# Patient Record
Sex: MALE | Race: WHITE
[De-identification: names, ages, dates, MRNs, and addresses within clinical notes are randomized per-mention and may not be internally consistent; named-entity substitution may affect disease eponyms.]

---

## 2012-09-13 VITALS — DIASTOLIC BLOOD PRESSURE: 99 MMHG | SYSTOLIC BLOOD PRESSURE: 137 MMHG

## 2019-12-29 ENCOUNTER — HOSPITAL ENCOUNTER (EMERGENCY)
Dept: HOSPITAL 97 - ER | Age: 74
Discharge: HOME | End: 2019-12-29
Payer: COMMERCIAL

## 2019-12-29 VITALS — TEMPERATURE: 99.3 F

## 2019-12-29 VITALS — OXYGEN SATURATION: 97 %

## 2019-12-29 VITALS — SYSTOLIC BLOOD PRESSURE: 138 MMHG | DIASTOLIC BLOOD PRESSURE: 86 MMHG

## 2019-12-29 DIAGNOSIS — J01.90: Primary | ICD-10-CM

## 2019-12-29 DIAGNOSIS — Z79.01: ICD-10-CM

## 2019-12-29 DIAGNOSIS — Z86.718: ICD-10-CM

## 2019-12-29 DIAGNOSIS — F17.210: ICD-10-CM

## 2019-12-29 LAB
ALBUMIN SERPL BCP-MCNC: 3.2 G/DL (ref 3.4–5)
ALP SERPL-CCNC: 73 U/L (ref 45–117)
ALT SERPL W P-5'-P-CCNC: 31 U/L (ref 12–78)
AST SERPL W P-5'-P-CCNC: 25 U/L (ref 15–37)
BUN BLD-MCNC: 20 MG/DL (ref 7–18)
GLUCOSE SERPLBLD-MCNC: 122 MG/DL (ref 74–106)
HCT VFR BLD CALC: 42 % (ref 39.6–49)
LYMPHOCYTES # SPEC AUTO: 1.5 K/UL (ref 0.7–4.9)
PMV BLD: 8.3 FL (ref 7.6–11.3)
POTASSIUM SERPL-SCNC: 3.9 MMOL/L (ref 3.5–5.1)
RBC # BLD: 4.52 M/UL (ref 4.33–5.43)
TSH SERPL DL<=0.05 MIU/L-ACNC: 1.85 UIU/ML (ref 0.36–3.74)

## 2019-12-29 PROCEDURE — 87081 CULTURE SCREEN ONLY: CPT

## 2019-12-29 PROCEDURE — 36415 COLL VENOUS BLD VENIPUNCTURE: CPT

## 2019-12-29 PROCEDURE — 71046 X-RAY EXAM CHEST 2 VIEWS: CPT

## 2019-12-29 PROCEDURE — 87804 INFLUENZA ASSAY W/OPTIC: CPT

## 2019-12-29 PROCEDURE — 80053 COMPREHEN METABOLIC PANEL: CPT

## 2019-12-29 PROCEDURE — 99284 EMERGENCY DEPT VISIT MOD MDM: CPT

## 2019-12-29 PROCEDURE — 84443 ASSAY THYROID STIM HORMONE: CPT

## 2019-12-29 PROCEDURE — 87070 CULTURE OTHR SPECIMN AEROBIC: CPT

## 2019-12-29 PROCEDURE — 85025 COMPLETE CBC W/AUTO DIFF WBC: CPT

## 2019-12-29 NOTE — EDPHYS
Physician Documentation                                                                           

 [facility]                                                                 

Name: Ilia Timmons                                                                              

Age: 74 yrs                                                                                       

Sex: Male                                                                                         

: 1945                                                                                   

MRN: [id number]                                                                                   

Arrival Date: 2019                                                                          

Time: 13:11                                                                                       

Account#: [id number]                                                                            

Bed 4                                                                                             

Private MD: Carmenza Thomas H                                                                    

ED Physician Keanu Mcallister                                                                      

HPI:                                                                                              

                                                                                             

15:06 This 74 yrs old  Male presents to ER via Ambulatory with complaints of Chest   pm1 

      Congestion.                                                                                 

15:06 The patient or guardian reports cough, sinus congestion and pain. Onset: The            pm1 

      symptoms/episode began/occurred 3 week(s) ago. Severity of symptoms: in the emergency       

      department the symptoms cough and improved but sinus pain and congestion worse.             

      Modifying factors: The symptoms are alleviated by nothing, the symptoms are aggravated      

      by nothing. Associated signs and symptoms: Pertinent positives: earache, decreased          

      energy. On and off fever and chills, Pertinent negatives: chest pain, diarrhea, sore        

      throat, vomiting. The patient has not recently seen a physician.                            

                                                                                                  

Historical:                                                                                       

- Allergies:                                                                                      

13:33 No Known Allergies;                                                                     ss  

- Home Meds:                                                                                      

13:33 Warfarin Oral [Active];                                                                 ss  

- PMHx:                                                                                           

13:33 DVT; PE;                                                                                ss  

- PSHx:                                                                                           

13:33 back sx; right shoulder; Knee surgery;                                                  ss  

                                                                                                  

- Immunization history:: Adult Immunizations up to date.                                          

- Social history:: Smoking status: Patient uses tobacco products, smokes two packs                

  cigarettes per day.                                                                             

- Ebola Screening: : Patient negative for fever greater than or equal to 101.5 degrees            

  Fahrenheit, and additional compatible Ebola Virus Disease symptoms.                             

                                                                                                  

                                                                                                  

ROS:                                                                                              

15:06 Constitutional: Negative for fever, chills, and weight loss, Eyes: Negative for injury, pm1 

      pain, redness, and discharge.                                                               

15:06 Neck: Negative for injury, pain, and swelling, Cardiovascular: Negative for chest pain,     

      palpitations, and edema.                                                                    

15:06 Abdomen/GI: Negative for abdominal pain, nausea, vomiting, diarrhea, and constipation,      

      Back: Negative for injury and pain, : Negative for injury, bleeding, discharge, and       

      swelling, MS/Extremity: Negative for injury and deformity, Skin: Negative for injury,       

      rash, and discoloration, Neuro: Negative for headache, weakness, numbness, tingling,        

      and seizure.                                                                                

15:06 ENT: Positive for ear pain, sinus congestion, sinus pain, Negative for difficulty           

      swallowing, difficulty handling secretions, hoarseness.                                     

15:06 Respiratory: Positive for cough, Negative for shortness of breath, sputum production,       

      wheezing.                                                                                   

                                                                                                  

Exam:                                                                                             

15:06 Constitutional:  This is a well developed, well nourished patient who is awake, alert,  pm1 

      and in no acute distress.                                                                   

15:06 Eyes:  Pupils equal round and reactive to light, extra-ocular motions intact.  Lids and     

      lashes normal.  Conjunctiva and sclera are non-icteric and not injected.  Cornea within     

      normal limits.  Periorbital areas with no swelling, redness, or edema. ENT:  Nares          

      patent. No nasal discharge, no septal abnormalities noted.  Tympanic membranes are          

      normal and external auditory canals are clear.  Oropharynx with no redness, swelling,       

      or masses, exudates, or evidence of obstruction, uvula midline.  Mucous membranes           

      moist. Neck:  Trachea midline, no thyromegaly or masses palpated, and no cervical           

      lymphadenopathy.  Supple, full range of motion without nuchal rigidity, or vertebral        

      point tenderness.  No Meningismus. Chest/axilla:  Normal chest wall appearance and          

      motion.  Nontender with no deformity.  No lesions are appreciated. Cardiovascular:          

      Regular rate and rhythm with a normal S1 and S2.  No gallops, murmurs, or rubs.  Normal     

      PMI, no JVD.  No pulse deficits. Respiratory:  Lungs have equal breath sounds               

      bilaterally, clear to auscultation and percussion.  No rales, rhonchi or wheezes noted.     

       No increased work of breathing, no retractions or nasal flaring. Abdomen/GI:  Soft,        

      non-tender, with normal bowel sounds.  No distension or tympany.  No guarding or            

      rebound.  No evidence of tenderness throughout. Back:  No spinal tenderness.  No            

      costovertebral tenderness.  Full range of motion. Skin:  Warm, dry with normal turgor.      

      Normal color with no rashes, no lesions, and no evidence of cellulitis. MS/ Extremity:      

      Pulses equal, no cyanosis.  Neurovascular intact.  Full, normal range of motion.            

15:06 Head/face: Sinus tenderness, is located over the  right frontal sinus, left frontal         

      sinus, right maxillary sinus and left maxillary sinus.                                      

15:06 Neuro: Orientation: is normal, Motor: is normal, moves all fours.                           

                                                                                                  

Vital Signs:                                                                                      

13:33  / 91; Pulse 84; Resp 19; Temp 99.3(TE); Pulse Ox 94% on R/A; Weight 90.72 kg     ss  

      (R); Height 6 ft. 0 in. (182.88 cm) (R); Pain 2/10;                                         

15:27 BP 91 / 54; Pulse 64; Resp 17; Pulse Ox 95% on R/A;                                     tw2 

15:53  / 83; Pulse 65; Resp 17; Pulse Ox 97% on R/A;                                    tw2 

16:32  / 86; Pulse 66; Resp 17; Pulse Ox 97% on R/A;                                    tw2 

13:33 Body Mass Index 27.12 (90.72 kg, 182.88 cm)                                             ss  

                                                                                                  

MDM:                                                                                              

14:12 Patient medically screened.                                                             pm1 

16:10 Data reviewed: vital signs. Data interpreted: Pulse oximetry: on room air is 97 %.      pm1 

      Interpretation: normal. Counseling: I had a detailed discussion with the patient and/or     

      guardian regarding: the historical points, exam findings, and any diagnostic results        

      supporting the discharge/admit diagnosis, lab results, radiology results, the need for      

      outpatient follow up, to return to the emergency department if symptoms worsen or           

      persist or if there are any questions or concerns that arise at home.                       

                                                                                                  

                                                                                             

14:20 Order name: CBC with Diff; Complete Time: 15:01                                         pm1 

                                                                                             

14:20 Order name: CMP; Complete Time: 15:26                                                   pm1 

                                                                                             

14:20 Order name: TSH; Complete Time: 15:26                                                   pm1 

                                                                                             

14:20 Order name: Flu; Complete Time: 15:26                                                   pm1 

                                                                                             

14:20 Order name: Strep; Complete Time: 15:26                                                 pm1 

                                                                                             

15:06 Order name: Throat Culture                                                              EDMS

                                                                                             

14:20 Order name: Chest Pa And Lat (2 Views) XRAY; Complete Time: 16:09                       pm1 

                                                                                             

14:21 Order name: IV Start; Complete Time: 14:56                                              tw2 

                                                                                                  

Administered Medications:                                                                         

No medications were administered                                                                  

                                                                                                  

                                                                                                  

Disposition:                                                                                      

19 16:10 Discharged to Home. Impression: Acute sinusitis.                                   

- Condition is Stable.                                                                            

- Discharge Instructions: Sinusitis, Adult.                                                       

- Prescriptions for Amoxicillin 500 mg Oral Capsule - take 1 capsule by ORAL route                

  every 8 hours for 10 days; 30 tablet.                                                           

- Medication Reconciliation Form, Thank You Letter, Antibiotic Education, Prescription            

  Opioid Use form.                                                                                

- Follow up: Emergency Department; When: As needed; Reason: Worsening of condition.               

  Follow up: Private Physician; When: 2 - 3 days; Reason: Recheck today's complaints,             

  Continuance of care, Re-evaluation by your physician.                                           

- Problem is new.                                                                                 

- Symptoms have improved.                                                                         

                                                                                                  

                                                                                                  

                                                                                                  

Addendum:                                                                                         

2020                                                                                        

     11:06 Co-signature as Attending Physician, Keanu Mcallister MD I agree with the assessment and  c
ha

           plan of care.                                                                          

                                                                                                  

Signatures:                                                                                       

Dispatcher [employer]                           EDKeanu Hudson MD MD cha Smirch, Shelby, RN                      RN   ss                                                   

Lalo Solorio NP                    NP   pm1                                                  

Grecia Henning RN                          RN   tw2                                                  

                                                                                                  

Corrections: (The following items were deleted from the chart)                                    

                                                                                             

16:33 16:10 2019 16:10 Discharged to Home. Impression: Acute sinusitis. Condition is    tw2 

      Stable. Forms are Medication Reconciliation Form, Thank You Letter, Antibiotic              

      Education, Prescription Opioid Use. Follow up: Emergency Department; When: As needed;       

      Reason: Worsening of condition. Follow up: Private Physician; When: 2 - 3 days; Reason:     

      Recheck today's complaints, Continuance of care, Re-evaluation by your physician.           

      Problem is new. Symptoms have improved. pm1                                                 

                                                                                                  

**************************************************************************************************

## 2019-12-29 NOTE — RAD REPORT
EXAM DESCRIPTION:  Avni Johnson (2 Views)12/29/2019 3:23 pm

 

CLINICAL HISTORY:  Cough

 

COMPARISON:  2012

 

FINDINGS:   The lungs appear clear of acute infiltrate. The heart is normal size

 

Old rib fractures are present

 

IMPRESSION:   No acute abnormalities displayed

## 2019-12-29 NOTE — ER
Nurse's Notes                                                                                     

 [facility] [place]                                                                 

Name: Ilia Timmons                                                                              

Age: 74 yrs                                                                                       

Sex: Male                                                                                         

: 1945                                                                                   

MRN: [id number]                                                                                   

Arrival Date: 2019                                                                          

Time: 13:11                                                                                       

Account#: [id number]                                                                            

Bed 4                                                                                             

Private MD: Carmenza Thomas H                                                                    

Diagnosis: Acute sinusitis                                                                        

                                                                                                  

Presentation:                                                                                     

                                                                                             

13:30 Presenting complaint: Patient states: chest congestion x several weeks, chills, fever   ss  

      intermittent. No appetite and energy. Transition of care: patient was not received from     

      another setting of care. Onset of symptoms is unknown.                                      

13:30 Method Of Arrival: Ambulatory                                                           ss  

13:30 Acuity: TAMMIE 4                                                                           ss  

15:55 Risk Assessment: Do you want to hurt yourself or someone else? Patient reports no       tw2 

      desire to harm self or others. Initial Sepsis Screen: Does the patient meet any 2           

      criteria? No. Patient's initial sepsis screen is negative. Does the patient have a          

      suspected source of infection? No. Patient's initial sepsis screen is negative. Care        

      prior to arrival: None.                                                                     

                                                                                                  

Triage Assessment:                                                                                

13:35 Neuro: Level of Consciousness is awake, alert, obeys commands, Oriented to person,      ss  

      place, time, situation. Cardiovascular: Capillary refill < 3 seconds is brisk in            

      bilateral fingers. Respiratory: Airway is patent Respiratory effort is even, unlabored,     

      Respiratory pattern is regular, symmetrical. Respiratory: Reports cough that is. Derm:      

      Skin is pink, warm \T\ dry.                                                                 

                                                                                                  

Historical:                                                                                       

- Allergies:                                                                                      

13:33 No Known Allergies;                                                                     ss  

- Home Meds:                                                                                      

13:33 Warfarin Oral [Active];                                                                 ss  

- PMHx:                                                                                           

13:33 DVT; PE;                                                                                ss  

- PSHx:                                                                                           

13:33 back sx; right shoulder; Knee surgery;                                                  ss  

                                                                                                  

- Immunization history:: Adult Immunizations up to date.                                          

- Social history:: Smoking status: Patient uses tobacco products, smokes two packs                

  cigarettes per day.                                                                             

- Ebola Screening: : Patient negative for fever greater than or equal to 101.5 degrees            

  Fahrenheit, and additional compatible Ebola Virus Disease symptoms.                             

                                                                                                  

                                                                                                  

Screening:                                                                                        

15:55 Abuse screen: Denies threats or abuse. Nutritional screening: No deficits noted.        tw2 

      Tuberculosis screening: No symptoms or risk factors identified. Fall Risk None              

      identified.                                                                                 

                                                                                                  

Assessment:                                                                                       

14:03 General: Appears in no apparent distress. Behavior is calm, cooperative, appropriate    tw2 

      for age. Pain: Denies pain. Neuro: Level of Consciousness is awake, alert, obeys            

      commands, Oriented to person, place, time, situation. Cardiovascular: Heart tones S1 S2     

      Patient's skin is warm and dry. Respiratory: Reports cough that is non-productive,          

      persistent Airway is patent Respiratory effort is even, unlabored, Respiratory pattern      

      is regular, symmetrical, Breath sounds are clear bilaterally. GI: No signs and/or           

      symptoms were reported involving the gastrointestinal system. Abdomen is flat, Bowel        

      sounds present X 4 quads. : No signs and/or symptoms were reported regarding the          

      genitourinary system. EENT: Reports nasal congestion nasal discharge. Derm: No signs        

      and/or symptoms reported regarding the dermatologic system. Musculoskeletal: Range of       

      motion: intact in all extremities.                                                          

15:27 Reassessment: Patient appears in no apparent distress at this time. No changes from     tw2 

      previously documented assessment. Patient and/or family updated on plan of care and         

      expected duration. Pain level reassessed. Patient is alert, oriented x 3, equal             

      unlabored respirations, skin warm/dry/pink.                                                 

15:54 Reassessment: Patient appears in no apparent distress at this time. No changes from     tw2 

      previously documented assessment. Patient and/or family updated on plan of care and         

      expected duration. Pain level reassessed. Patient is alert, oriented x 3, equal             

      unlabored respirations, skin warm/dry/pink.                                                 

16:32 Reassessment: Patient appears in no apparent distress at this time. No changes from     tw2 

      previously documented assessment. Patient and/or family updated on plan of care and         

      expected duration. Pain level reassessed. Patient is alert, oriented x 3, equal             

      unlabored respirations, skin warm/dry/pink.                                                 

                                                                                                  

Vital Signs:                                                                                      

13:33  / 91; Pulse 84; Resp 19; Temp 99.3(TE); Pulse Ox 94% on R/A; Weight 90.72 kg     ss  

      (R); Height 6 ft. 0 in. (182.88 cm) (R); Pain 2/10;                                         

15:27 BP 91 / 54; Pulse 64; Resp 17; Pulse Ox 95% on R/A;                                     tw2 

15:53  / 83; Pulse 65; Resp 17; Pulse Ox 97% on R/A;                                    tw2 

16:32  / 86; Pulse 66; Resp 17; Pulse Ox 97% on R/A;                                    tw2 

13:33 Body Mass Index 27.12 (90.72 kg, 182.88 cm)                                               

                                                                                                  

ED Course:                                                                                        

13:11 Patient arrived in ED.                                                                  as  

13:12 Carmenza Thomas DO is Private Physician.                                               as  

13:32 Triage completed.                                                                       ss  

13:33 Arm band placed on right wrist.                                                           

14:03 Bed in low position. Side rails up X2. Cardiac monitor on. Pulse ox on. NIBP on. Warm   tw2 

      blanket given.                                                                              

14:11 Grecia Henning, RN is Primary Nurse.                                                        tw2 

14:12 Lalo Solorio NP is PHCP.                                                           pm1 

14:12 Keanu Mcallister MD is Attending Physician.                                             pm1 

15:22 Chest Pa And Lat (2 Views) XRAY In Process Unspecified.                                 EDMS

16:32 No provider procedures requiring assistance completed. IV discontinued, intact,         tw2 

      bleeding controlled, No redness/swelling at site. Pressure dressing applied.                

                                                                                                  

Administered Medications:                                                                         

No medications were administered                                                                  

                                                                                                  

                                                                                                  

Outcome:                                                                                          

16:10 Discharge ordered by MD.                                                                pm1 

16:32 Discharged to home ambulatory, with family.                                             tw2 

16:32 Condition: stable                                                                           

16:32 Discharge instructions given to patient, family, Instructed on discharge instructions,      

      follow up and referral plans. medication usage, Demonstrated understanding of               

      instructions, follow-up care, medications, Prescriptions given X 2.                         

16:33 Patient left the ED.                                                                    tw2 

                                                                                                  

Signatures:                                                                                       

Dispatcher [employer]                           Bethany Romo Shelby, RN RN                                                      

Lalo Solorio NP                    NP   pm1                                                  

Grecia Henning RN                          RN   tw2                                                  

                                                                                                  

**************************************************************************************************

## 2021-07-27 ENCOUNTER — HOSPITAL ENCOUNTER (EMERGENCY)
Dept: HOSPITAL 97 - ER | Age: 76
Discharge: HOME | End: 2021-07-27
Payer: COMMERCIAL

## 2021-07-27 DIAGNOSIS — F17.210: ICD-10-CM

## 2021-07-27 DIAGNOSIS — S22.41XA: Primary | ICD-10-CM
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
## 2021-07-27 DIAGNOSIS — J44.1: ICD-10-CM

## 2021-07-27 DIAGNOSIS — Z86.718: ICD-10-CM

## 2021-07-27 DIAGNOSIS — Z86.711: ICD-10-CM

## 2021-07-27 NOTE — RAD REPORT
EXAM DESCRIPTION:  RAD - Ribs  Right - 7/27/2021 12:42 pm

 

CLINICAL HISTORY:  fall, SOB

 

COMPARISON:  Chest Pa And Lat (2 Views) dated 12/29/2019

 

FINDINGS:  There is mild cortical offset is seen involving the right lateral fourth and fifth ribs pantoja
spicious for rib fractures. No underlying pneumothorax is seen.

## 2021-07-27 NOTE — ER
Nurse's Notes                                                                                     

 [facility]                                                                 

Name: Ilia Timmons                                                                              

Age: 76 yrs                                                                                       

Sex: Male                                                                                         

: 1945                                                                                   

MRN: [id number]                                                                                   

Arrival Date: 2021                                                                          

Time: 11:08                                                                                       

Account#: [id number]                                                                            

Bed 2                                                                                             

Private MD: Carmenza Thomas H                                                                    

Diagnosis: COPD/ Chronic obstructive pulmonary disease with (acute) exacerbation;Multiple         

  fractures of ribs, right side                                                                   

                                                                                                  

Presentation:                                                                                     

                                                                                             

11:22 Chief complaint: Patient states: fell on  , was sitting kermit stool and his feet    iw  

      got tangled up and he fell and hit the brick porch with left knee and left elbow and        

      right side of ribs, now feels like he has some chest congestion, seems to have SOB on       

      exertion. Care prior to arrival: None. Mechanism of Injury: Fall out of chair. Trauma       

      event details: Injury occurred in the [facility].                                   

11:22 Acuity: TAMMIE 3                                                                           iw  

11:22 Method Of Arrival: Wheelchair                                                           iw  

11:24 Coronavirus screen: At this time, the client does not indicate any symptoms associated  iw  

      with coronavirus-19. Ebola Screen: Patient negative for fever greater than or equal to      

      101.5 degrees Fahrenheit, and additional compatible Ebola Virus Disease symptoms            

      Patient denies exposure to infectious person. Patient denies travel to an                   

      Ebola-affected area in the 21 days before illness onset. No symptoms or risks               

      identified at this time. Initial Sepsis Screen: Does the patient meet any 2 criteria?       

      No. Patient's initial sepsis screen is negative. Does the patient have a suspected          

      source of infection? No. Patient's initial sepsis screen is negative. Risk Assessment:      

      Do you want to hurt yourself or someone else? Patient reports no desire to harm self or     

      others. Onset of symptoms was 2021.                                                

                                                                                                  

Trauma Activation: Not Applicable                                                                 

 Physician: ED Physician; Name: ; Notified At: ; Arrived At:                                      

 Physician: General Surgeon; Name: ; Notified At: ; Arrived At:                                   

 Physician: Radiology; Name: ; Notified At: ; Arrived At:                                         

 Physician: Respiratory; Name: ; Notified At: ; Arrived At:                                       

 Physician: Lab; Name: ; Notified At: ; Arrived At:                                               

                                                                                                  

Historical:                                                                                       

- Allergies:                                                                                      

11:25 No Known Allergies;                                                                     iw  

- Home Meds:                                                                                      

11:25 ibandronate 150 mg oral tab 1 tab once moly [Active]; tamsulosin 0.4 mg oral cap 1 cap  iw  

      once daily [Active]; warfarin 7.5 mg Oral tab 1 tab once daily [Active];                    

- PMHx:                                                                                           

11:25 DVT; PE;                                                                                iw  

                                                                                                  

- Immunization history:: Client reports receiving the 2nd dose of the Covid vaccine.              

- Social history:: Smoking status: Patient reports the use of cigarette tobacco                   

  products, smokes two packs cigarettes per day.                                                  

- Immunization history: Last tetanus immunization: unknown.                                       

                                                                                                  

                                                                                                  

Screenin:29 Abuse screen: Denies threats or abuse. Denies injuries from another. Nutritional        ph  

      screening: No deficits noted. Tuberculosis screening: No symptoms or risk factors           

      identified. Fall Risk None identified.                                                      

                                                                                                  

Primary Survey:                                                                                   

13:28 NO uncontrolled hemorrhage observed. A: The patient is alert. Airway: patent, No        ph  

      supplemental oxygen in use on arrival. Oral cavity: clear. Breathing/Chest: Respiratory     

      pattern: regular, Respiratory effort: spontaneous, unlabored, Chest inspection:             

      symmetrical rise and fall of the chest. Circulation: Skin color: pink, Skin                 

      temperature: warm, dry. Disability Alert. Exposure/Environment: There is no evidence of     

      uncontrolled external bleeding.                                                             

                                                                                                  

Assessment:                                                                                       

13:27 General: Appears in no apparent distress. comfortable, slender, well groomed, Behavior  ph  

      is calm, cooperative, appropriate for age. Pain: Complains of pain in right lateral         

      anterior chest. Neuro: Level of Consciousness is awake, alert, obeys commands, Oriented     

      to person, place, time, situation. Cardiovascular: Capillary refill < 3 seconds in          

      bilateral fingers Patient's skin is warm and dry. Respiratory: Reports shortness of         

      breath at rest pain with cough pain with respiration Airway is patent Respiratory           

      effort is. GI: No signs and/or symptoms were reported involving the gastrointestinal        

      system. Derm: Skin is healthy with good turgor, Skin is pink, warm \T\ dry.                 

      Musculoskeletal: Circulation, motion, and sensation intact. Range of motion: limited in     

      all extremities.                                                                            

14:46 Reassessment: Patient appears in no apparent distress at this time. Patient and/or      ss  

      family updated on plan of care and expected duration. Pain level reassessed.                

14:47 Reassessment: Educated patient on IS. IS given.                                         ss  

                                                                                                  

Vital Signs:                                                                                      

11:24  / 82; Pulse 71; Resp 16; Temp 98.2; Pulse Ox 95% on R/A; Weight 90.72 kg; Height iw  

      6 ft. (182.88 cm);                                                                          

11:24 Body Mass Index 27.12 (90.72 kg, 182.88 cm)                                             iw  

                                                                                                  

Yudy Coma Score:                                                                               

13:29 Eye Response: spontaneous(4). Verbal Response: oriented(5). Motor Response: obeys       ph  

      commands(6). Total: 15.                                                                     

                                                                                                  

Trauma Score (Adult):                                                                             

13:29 Eye Response: spontaneous(1); Verbal Response: oriented(1); Motor Response: obeys       ph  

      commands(2); Systolic BP: > 89 mm Hg(4); Respiratory Rate: 10 to 29 per min(4); Lequire     

      Score: 15; Trauma Score: 12                                                                 

                                                                                                  

ED Course:                                                                                        

11:08 Patient arrived in ED.                                                                  mr  

11:09 Carmenza Thomas DO is Private Physician.                                               mr  

11:24 Triage completed.                                                                       iw  

11:26 Arm band placed on.                                                                     iw  

12:42 Ribs Right XRAY In Process Unspecified.                                                 EDMS

12:58 Keanu Yoder PA is PHCP.                                                                cp  

12:58 Nasir Kent MD is Attending Physician.                                                cp  

13:11 Eve Obrien, RN is Primary Nurse.                                                    ph  

13:29 Patient has correct armband on for positive identification. Bed in low position. Call   ph  

      light in reach. Side rails up X 1. Pulse ox on. NIBP on. Door closed. Noise minimized.      

13:30 Patient maintains SpO2 saturation greater than 95% on room air. Thermoregulation: warm  ph  

      blanket given to patient.                                                                   

14:46 No provider procedures requiring assistance completed. Patient did not have IV access   ss  

      during this emergency room visit.                                                           

                                                                                                  

Administered Medications:                                                                         

13:27 Drug: Albuterol - atroVENT (ipratropium) (3:1) (2.5 mg - 0.5 mg) 3 ml Route: Nebulizer; ph  

13:27 Drug: predniSONE 60 mg Route: PO;                                                       ph  

13:27 Drug: HYDROcodone-acetaminophen 5 mg-325 mg 1 tabs Route: PO;                           ph  

                                                                                                  

                                                                                                  

Outcome:                                                                                          

14:18 Discharge ordered by MD.                                                                cp  

14:47 Discharged to home ambulatory.                                                          ss  

14:47 Condition: good                                                                             

14:47 Discharge instructions given to patient, Instructed on discharge instructions, follow       

      up and referral plans. Demonstrated understanding of instructions, follow-up care,          

      Prescriptions given X 2.                                                                    

14:47 Patient left the ED.                                                                    ss  

                                                                                                  

Signatures:                                                                                       

Dispatcher [employer]                           EDMS                                                 

Pickett, Fariba                                 mr                                                   

Diaz, Gabi, RN                     RN   Luana Solorzano RN                      RN   ss                                                   

Eve Obrien RN                      RN                                                      

Beba, Keanu, HOLLI DE LA GARZA   cp                                                   

                                                                                                  

**************************************************************************************************

## 2021-07-27 NOTE — EDPHYS
Physician Documentation                                                                           

 [facility]                                                                 

Name: Ilia Timmons                                                                              

Age: 76 yrs                                                                                       

Sex: Male                                                                                         

: 1945                                                                                   

MRN: [id number]                                                                                   

Arrival Date: 2021                                                                          

Time: 11:08                                                                                       

Account#: [id number]                                                                            

Bed 2                                                                                             

Private MD: Carmenza Thomas H                                                                    

ED Physician Nasir Kent                                                                         

HPI:                                                                                              

                                                                                             

13:05 This 76 yrs old  Male presents to ER via Wheelchair with complaints of Fall    cp  

      Injury.                                                                                     

13:05 Details of fall: The patient fell from an upright position, while standing.             cp  

13:05 Onset: The symptoms/episode began/occurred 2 day(s) ago.                                cp  

13:05 Associated injuries: The patient sustained injury to the chest, specifically the right  cp  

      lateral anterior chest and right lateral posterior chest, pain with breathing, pain         

      with movement, tenderness. Severity of symptoms: in the emergency department the            

      symptoms are unchanged, despite home interventions. Patient reports fall after misstep      

      2 days ago in which he struck left knee and right rib area. Denies hitting head, denies     

      LOC, denies syncope. Patient denies taking blood thinners.                                  

                                                                                                  

Historical:                                                                                       

- Allergies:                                                                                      

11:25 No Known Allergies;                                                                     iw  

- Home Meds:                                                                                      

11:25 ibandronate 150 mg oral tab 1 tab once moly [Active]; tamsulosin 0.4 mg oral cap 1 cap  iw  

      once daily [Active]; warfarin 7.5 mg Oral tab 1 tab once daily [Active];                    

- PMHx:                                                                                           

11:25 DVT; PE;                                                                                iw  

                                                                                                  

- Immunization history:: Client reports receiving the 2nd dose of the Covid vaccine.              

- Social history:: Smoking status: Patient reports the use of cigarette tobacco                   

  products, smokes two packs cigarettes per day.                                                  

- Immunization history: Last tetanus immunization: unknown.                                       

                                                                                                  

                                                                                                  

ROS:                                                                                              

13:10 Cardiovascular: Positive for chest pain, of the right lateral anterior chest and right  cp  

      lateral posterior chest.                                                                    

13:10 Constitutional: Negative for body aches, chills, fever, poor PO intake.                 cp  

13:10 Respiratory: Positive for shortness of breath, wheezing, Negative for cough.                

13:10 Abdomen/GI: Negative for abdominal pain, constipation.                                  cp  

13:10 Eyes: Negative for injury, pain, redness, and discharge.                                cp  

13:10 ENT: Negative for ear pain, sore throat, difficulty swallowing, difficulty handling         

      secretions.                                                                                 

13:10 Back: Negative for pain at rest, pain with movement.                                        

13:10 MS/extremity: Positive for abrasion, pain, of the left knee, Negative for decreased         

      range of motion.                                                                            

13:10 Neuro: Negative for altered mental status, headache, loss of consciousness, syncope,        

      weakness.                                                                                   

13:10 All other systems are negative.                                                             

                                                                                                  

Exam:                                                                                             

13:15 Constitutional: The patient appears in no acute distress, alert, awake,                 cp  

      non-diaphoretic, non-toxic, well developed, well nourished, uncomfortable.                  

13:15 Head/Face:  Normocephalic, atraumatic.                                                  cp  

13:15 Eyes: Periorbital structures: appear normal, Pupils: equal, round, and reactive to      cp  

      light and accomodation, Extraocular movements: intact throughout, Conjunctiva: normal,      

      no exudate, no injection, Sclera: no appreciated abnormality, Lids and lashes: appear       

      normal, bilaterally.                                                                        

13:15 ENT: External ear(s): are unremarkable, Nose: is normal, Mouth: Lips: moist, Oral           

      mucosa: moist, Posterior pharynx: Airway: no evidence of obstruction, patent.               

13:15 Neck: C-spine: vertebral tenderness, is not appreciated, crepitus, is not appreciated,      

      ROM/movement: is normal, is supple, without pain, no range of motions limitations.          

13:15 Chest/axilla: Inspection: normal, Palpation: crepitus, is not appreciated, tenderness,      

      that is moderate, of the  right lateral anterior chest and right lateral posterior          

      chest.                                                                                      

13:15 Cardiovascular: Rate: normal, Rhythm: regular, Edema: is not appreciated, JVD: is not       

      appreciated.                                                                                

13:15 Respiratory: the patient does not display signs of respiratory distress,  Respirations:     

      labored breathing, that is mild, Breath sounds: decreased breath sounds, that are           

      moderate, throughout, stridor, is not appreciated, wheezing: that is mild, is heard         

      diffusely.                                                                                  

13:15 Abdomen/GI: Inspection: abdomen appears normal, Bowel sounds: active, all quadrants,        

      Palpation: abdomen is soft and non-tender, in all quadrants, rebound tenderness, is not     

      appreciated, involuntary guarding, is not appreciated.                                      

13:15 Back: pain, is absent, ROM is normal, vertebral tenderness, is not appreciated.             

13:15 Musculoskeletal/extremity: Extremities: grossly normal except: noted in the left knee:      

      abrasion, tenderness, There is no evidence of  decreased ROM, deformity, ROM: full          

      passive range of motion, in the left knee.                                                  

13:15 Neuro: Orientation: to person, place \T\ time. Mentation: is normal, Motor: moves all     cp

      fours, strength is normal, Sensation: no obvious gross deficits.                            

                                                                                                  

Vital Signs:                                                                                      

11:24  / 82; Pulse 71; Resp 16; Temp 98.2; Pulse Ox 95% on R/A; Weight 90.72 kg; Height iw  

      6 ft. (182.88 cm);                                                                          

11:24 Body Mass Index 27.12 (90.72 kg, 182.88 cm)                                             iw  

                                                                                                  

Yudy Coma Score:                                                                               

13:29 Eye Response: spontaneous(4). Verbal Response: oriented(5). Motor Response: obeys       ph  

      commands(6). Total: 15.                                                                     

                                                                                                  

Trauma Score (Adult):                                                                             

13:29 Eye Response: spontaneous(1); Verbal Response: oriented(1); Motor Response: obeys       ph  

      commands(2); Systolic BP: > 89 mm Hg(4); Respiratory Rate: 10 to 29 per min(4); [place]     

      Score: 15; Trauma Score: 12                                                                 

                                                                                                  

MDM:                                                                                              

12:59 Patient medically screened.                                                             cp  

13:00 Differential diagnosis: closed head injury, contusion, fracture, laceration, multiple   cp  

      trauma.                                                                                     

14:16 Data reviewed: vital signs, nurses notes, radiologic studies, plain films, Vital signs  cp  

      stable. Patient resting comfortably in exam room. Patient appears nontoxic and no signs     

      of respiratory distress at this time. Patient reports pain improved with meds. Patient      

      reports breathing improved with neb treatment. Will discharge to home. Recommend            

      follow-up with primary care physician for further pain control. Will send home with an      

      incentive spirometry..                                                                      

                                                                                                  

                                                                                             

11:31 Order name: Ribs Right XRAY; Complete Time: 13:01                                       iw  

                                                                                             

13:01 Interpretation: Report reviewed.                                                        cp  

                                                                                             

13:09 Order name: INCENTIVE SPIROMETRY                                                        cp  

                                                                                                  

Administered Medications:                                                                         

13:27 Drug: Albuterol - atroVENT (ipratropium) (3:1) (2.5 mg - 0.5 mg) 3 ml Route: Nebulizer; ph  

13:27 Drug: predniSONE 60 mg Route: PO;                                                       ph  

13:27 Drug: HYDROcodone-acetaminophen 5 mg-325 mg 1 tabs Route: PO;                           ph  

                                                                                                  

                                                                                                  

Disposition:                                                                                      

16:51 Co-signature as Attending Physician, Nasir Kent MD.                                    rn  

                                                                                                  

Disposition Summary:                                                                              

21 14:18                                                                                    

Discharge Ordered                                                                                 

      Location: Home                                                                          cp  

      Problem: new                                                                            cp  

      Symptoms: have improved                                                                 cp  

      Condition: Stable                                                                       cp  

      Diagnosis                                                                                   

        - COPD/ Chronic obstructive pulmonary disease with (acute) exacerbation               cp  

        - Multiple fractures of ribs, right side                                              cp  

      Followup:                                                                               cp  

        - With: Private Physician                                                                  

        - When: 2 - 3 days                                                                         

        - Reason: Recheck today's complaints                                                       

      Discharge Instructions:                                                                     

        - Discharge Summary Sheet                                                             cp  

        - Rib Fracture                                                                        cp  

        - Chronic Obstructive Pulmonary Disease Exacerbation                                  cp  

        - How to Use an Incentive Spirometer                                                  cp  

      Forms:                                                                                      

        - Medication Reconciliation Form                                                      cp  

        - Thank You Letter                                                                    cp  

        - Antibiotic Education                                                                cp  

        - Prescription Opioid Use                                                             cp  

      Prescriptions:                                                                              

        - Prednisone 20 mg Oral Tablet                                                             

            - take 2 tablets by ORAL route once daily for 5 days; 10 tablet; Refills: 0,      cp  

      Product Selection Permitted                                                                 

        - Ultracet 37.5-325 mg Oral Tablet                                                         

            - take 1 tablet by ORAL route every 6 hours - for up to 5 days; do not exceed 8   cp  

      tablets per day.; 20 tablet; Refills: 0, Product Selection Permitted                        

Signatures:                                                                                       

Dispatcher [employer]                           Gabi Crocker, RN                     ROSY                                                      

Nasir Kent MD MD rn Hall, Patricia, RN RN   ph                                                   

Beba, Keanu, PA                         PA   cp                                                   

                                                                                                  

Corrections: (The following items were deleted from the chart)                                    

                                                                                             

14:11  13:10 Respiratory: Negative for cough, shortness of breath, wheezing, cp          cp  

                                                                                                  

**************************************************************************************************

## 2021-07-28 VITALS — DIASTOLIC BLOOD PRESSURE: 82 MMHG | SYSTOLIC BLOOD PRESSURE: 144 MMHG | TEMPERATURE: 98.2 F | OXYGEN SATURATION: 95 %

## 2022-01-03 ENCOUNTER — HOSPITAL ENCOUNTER (EMERGENCY)
Dept: HOSPITAL 97 - ER | Age: 77
Discharge: HOME | End: 2022-01-03
Payer: COMMERCIAL

## 2022-01-03 VITALS — TEMPERATURE: 97 F | OXYGEN SATURATION: 100 % | SYSTOLIC BLOOD PRESSURE: 141 MMHG | DIASTOLIC BLOOD PRESSURE: 73 MMHG

## 2022-01-03 DIAGNOSIS — M21.961: Primary | ICD-10-CM

## 2022-01-03 PROCEDURE — 36415 COLL VENOUS BLD VENIPUNCTURE: CPT

## 2022-01-03 PROCEDURE — 84550 ASSAY OF BLOOD/URIC ACID: CPT

## 2022-01-03 PROCEDURE — 99283 EMERGENCY DEPT VISIT LOW MDM: CPT

## 2022-01-03 NOTE — RAD REPORT
EXAM DESCRIPTION:  RAD - Ankle Right 3 View - 1/3/2022 11:46 am

 

CLINICAL HISTORY:  Pain;Swelling

 

COMPARISON:  Foot Left 3 View dated 10/1/2015

 

FINDINGS:  No acute fracture. No malalignment. 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 Degener
ative changes are present at the medial malleolus. Calcaneal spurring.

 

IMPRESSION:  No acute right ankle fractures identified. 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
. This can be better assessed with MRI if clinically indicated.

## 2022-01-03 NOTE — EDPHYS
Physician Documentation                                                                           

 [facility]                                                                 

Name: Ilia Timmons                                                                              

Age: 76 yrs                                                                                       

Sex: Male                                                                                         

: 1945                                                                                   

MRN: [id number]                                                                                   

Arrival Date: 2022                                                                          

Time: 10:09                                                                                       

Account#: [id number]                                                                            

Bed DIS4                                                                                          

Private MD: Carmenza Thomas H                                                                    

ED Physician Keanu Mcallister                                                                      

HPI:                                                                                              

                                                                                             

10:55 This 76 yrs old Male presents to ER via Ambulatory with complaints of right ankle pain. pm1 

10:55 The patient presents with pain. The complaints affect the right ankle. Onset: The       pm1 

      symptoms/episode began/occurred On and off for the past 1 year, worse the past 3 days.      

      Context: The problem was sustained at an unknown location, resulted from an unknown         

      cause, The patient can fully bear weight on the affected extremity. the patient is able     

      to ambulate. Associated signs and symptoms: Pertinent negatives: calf tenderness,           

      fever, numbness, tingling. Modifying factors: The symptoms are alleviated by pain           

      medication given for rib fracture a few months ago the symptoms are aggravated by           

      weight bearing, movement. Severity of symptoms: in the emergency department the             

      symptoms have improved. The patient has experienced similar episodes in the past,           

      multiple times. The patient has not recently seen a physician.                              

                                                                                                  

Historical:                                                                                       

- Allergies:                                                                                      

10:54 No Known Allergies;                                                                     iw  

- PMHx:                                                                                           

10:54 DVT; PE;                                                                                iw  

                                                                                                  

                                                                                                  

                                                                                                  

ROS:                                                                                              

10:55 Constitutional: Negative for fever, chills, and weight loss, Cardiovascular: Negative   pm1 

      for chest pain, palpitations, and edema, Respiratory: Negative for shortness of breath,     

      cough, wheezing, and pleuritic chest pain.                                                  

10:55 Skin: Negative for injury, rash, and discoloration, Neuro: Negative for headache,           

      weakness, numbness, tingling, and seizure.                                                  

10:55 MS/extremity: Positive for pain, swelling, tenderness, of the right ankle, Negative for     

      decreased range of motion, deformity.                                                       

10:55 All other systems are negative.                                                             

                                                                                                  

Exam:                                                                                             

10:55 Constitutional:  This is a well developed, well nourished patient who is awake, alert,  pm1 

      and in no acute distress. Head/Face:  Normocephalic, atraumatic.                            

10:55 Skin:  Warm, dry with normal turgor.  Normal color with no rashes, no lesions, and no       

      evidence of cellulitis.                                                                     

10:55 Cardiovascular: Exam negative for  acute changes, Rate: normal, Rhythm: regular,            

      Pulses: no pulse deficits are appreciated, Edema: is not appreciated.                       

10:55 Respiratory: Exam negative for  acute changes, respiratory distress, shortness of           

      breath.                                                                                     

10:55 Musculoskeletal/extremity: Extremities: grossly normal except: noted in the right           

      ankle: swelling, tenderness, There is no evidence of  decreased ROM, deformity, ROM:        

      full active range of motion, in the right foot and right ankle, full passive range of       

      motion, Circulation is intact in all extremities. the  right foot Sensation intact.         

      Calves: are non-tender, not swelling.                                                       

10:55 Neuro: Exam negative for acute changes, Orientation: is normal, Mentation: is normal,       

      Motor: is normal, moves all fours, Gait: is steady, at a normal pace, without               

      difficulty.                                                                                 

                                                                                                  

Vital Signs:                                                                                      

10:54  / 73; Pulse 98; Resp 16; Temp 97.0; Pulse Ox 100% on R/A; Pain 4/10;             iw  

                                                                                                  

MDM:                                                                                              

10:55 Patient medically screened.                                                             pm1 

11:00 Data reviewed: vital signs. Data interpreted: Pulse oximetry: on room air is 100 %.     pm1 

      Interpretation: normal.                                                                     

11:01 ED course: Patient refused pain medication offered because he took some at home this    pm1 

      morning.                                                                                    

12:05 Counseling: I had a detailed discussion with the patient and/or guardian regarding: the pm1 

      historical points, exam findings, and any diagnostic results supporting the                 

      discharge/admit diagnosis, radiology results, the need for outpatient follow up, for        

      definitive care, a orthopedic surgeon, to return to the emergency department if             

      symptoms worsen or persist or if there are any questions or concerns that arise at home.    

                                                                                                  

                                                                                             

10:55 Order name: Uric Acid                                                                   pm1 

                                                                                             

10:55 Order name: Uric Acid; Complete Time: 11:37                                             EDMS

                                                                                             

10:55 Order name: Ankle Right 3 View XRAY; Complete Time: 12:04                               pm1 

                                                                                                  

Administered Medications:                                                                         

No medications were administered                                                                  

                                                                                                  

                                                                                                  

Disposition:                                                                                      

                                                                                             

08:42 Co-signature as Attending Physician, Keanu Mcallister MD I agree with the assessment and  aime 

      plan of care.                                                                               

                                                                                                  

Disposition Summary:                                                                              

22 12:08                                                                                    

Discharge Ordered                                                                                 

      Location: Home                                                                          pm1 

      Problem: new                                                                            pm1 

      Symptoms: have improved                                                                 pm1 

      Condition: Stable                                                                       pm1 

      Diagnosis                                                                                   

        - Right ankle pain and swelling - oestochondral defect                                pm1 

      Followup:                                                                               pm1 

        - With: Emergency Department                                                               

        - When: As needed                                                                          

        - Reason: Worsening of condition                                                           

      Followup:                                                                               pm1 

        - With: Private Physician                                                                  

        - When: 2 - 3 days                                                                         

        - Reason: Recheck today's complaints, Continuance of care, Re-evaluation by your           

      physician                                                                                   

      Discharge Instructions:                                                                     

        - Discharge Summary Sheet                                                             pm1 

        - Ankle Pain                                                                          pm1 

      Forms:                                                                                      

        - Medication Reconciliation Form                                                      pm1 

        - Thank You Letter                                                                    pm1 

        - Antibiotic Education                                                                pm1 

        - Prescription Opioid Use                                                             pm1 

Signatures:                                                                                       

Dispatcher [employer]                           EDKeanu Hudson MD MD cha Williams, Irene, RN                     RN   Lalo Swanson, MATEUS                    NP   pm1                                                  

                                                                                                  

**************************************************************************************************

## 2022-01-03 NOTE — ER
Nurse's Notes                                                                                     

 [facility]                                                                 

Name: Ilia Timmons                                                                              

Age: 76 yrs                                                                                       

Sex: Male                                                                                         

: 1945                                                                                   

MRN: [id number]                                                                                   

Arrival Date: 2022                                                                          

Time: 10:09                                                                                       

Account#: [id number]                                                                            

Bed DIS4                                                                                          

Private MD: Carmenza Thomas H                                                                    

Diagnosis: Right ankle pain and swelling - oestochondral defect                                   

                                                                                                  

Presentation:                                                                                     

                                                                                             

10:53 Chief complaint:. Coronavirus screen: At this time, the client does not indicate any    iw  

      symptoms associated with coronavirus-19. Ebola Screen: Patient negative for fever           

      greater than or equal to 101.5 degrees Fahrenheit, and additional compatible Ebola          

      Virus Disease symptoms Patient denies exposure to infectious person. Patient denies         

      travel to an Ebola-affected area in the 21 days before illness onset. No symptoms or        

      risks identified at this time.                                                              

10:53 Acuity: TAMMIE 4                                                                           iw  

10:53 Chief complaint: Patient states: RIGHT ANKLE SWELLING FOR PAST YEARS, WORSE PAST THREE  iw  

      DAYS. Initial Sepsis Screen: Does the patient meet any 2 criteria? No. Patient's            

      initial sepsis screen is negative. Does the patient have a suspected source of              

      infection? No. Patient's initial sepsis screen is negative. Risk Assessment: Do you         

      want to hurt yourself or someone else? Patient reports no desire to harm self or            

      others. Onset of symptoms was .                                                         

10:53 Method Of Arrival: Ambulatory                                                           iw  

                                                                                                  

Historical:                                                                                       

- Allergies:                                                                                      

10:54 No Known Allergies;                                                                     iw  

- PMHx:                                                                                           

10:54 DVT; PE;                                                                                iw  

                                                                                                  

                                                                                                  

                                                                                                  

Screenin:11 Abuse screen: Denies threats or abuse. Denies injuries from another. Nutritional        iw  

      screening: No deficits noted. Tuberculosis screening: No symptoms or risk factors           

      identified. Fall Risk None identified.                                                      

                                                                                                  

Assessment:                                                                                       

11:11 General: Appears in no apparent distress. Behavior is calm, cooperative. Pain:          iw  

      Complains of pain in right ankle and anterior aspect of right ankle.                        

                                                                                                  

Vital Signs:                                                                                      

10:54  / 73; Pulse 98; Resp 16; Temp 97.0; Pulse Ox 100% on R/A; Pain 4/10;             iw  

                                                                                                  

ED Course:                                                                                        

10:09 Patient arrived in ED.                                                                  am2 

10:09 Carmenza Thomas DO is Private Physician.                                               am2 

10:53 Triage completed.                                                                       iw  

10:54 Lalo Solorio NP is PHCP.                                                           pm1 

10:54 Keanu Mcallister MD is Attending Physician.                                             pm1 

10:55 Arm band placed on.                                                                     iw  

11:04 Uric Acid Sent.                                                                         iw  

11:04 Uric Acid Sent.                                                                         iw  

11:11 Gabi Perales RN is Primary Nurse.                                                   iw  

11:46 Ankle Right 3 View XRAY In Process Unspecified.                                         EDMS

                                                                                                  

Administered Medications:                                                                         

No medications were administered                                                                  

                                                                                                  

                                                                                                  

Outcome:                                                                                          

12:08 Discharge ordered by MD.                                                                pm1 

12:22 Patient left the ED.                                                                    iw  

                                                                                                  

Signatures:                                                                                       

Dispatcher [employer]                           EDMS                                                 

Gabi Perales RN                     RN   iw                                                   

Lalo Solorio NP                    NP   pm1                                                  

Meena Alvares                               am2                                                  

                                                                                                  

Corrections: (The following items were deleted from the chart)                                    

10:56 10:54 Pulse 98bpm; Resp 16bpm; Pulse Ox 100% RA; Temp 97.0F; Pain 4/10; iw              iw  

11:12 11:11  / 82; iw                                                                   iw  

                                                                                                  

**************************************************************************************************

## 2022-07-11 ENCOUNTER — HOSPITAL ENCOUNTER (EMERGENCY)
Dept: HOSPITAL 97 - ER | Age: 77
Discharge: LEFT BEFORE DISCHARGE | End: 2022-07-11
Payer: COMMERCIAL

## 2022-07-11 DIAGNOSIS — Z02.9: Primary | ICD-10-CM

## 2022-07-11 NOTE — ER
Nurse's Notes                                                                                     

 [facility]                                                                 

Name: Ilia Timmons                                                                              

Age: 77 yrs                                                                                       

Sex: Male                                                                                         

: 1945                                                                                   

MRN: [id number]                                                                                   

Arrival Date: 2022                                                                          

Time: 18:30                                                                                       

Account#: [id number]                                                                            

Bed Waiting                                                                                       

Private MD: Carmenza Thomas H                                                                    

Diagnosis:                                                                                        

                                                                                                  

ED Course:                                                                                        

                                                                                             

18:30 Patient arrived in ED.                                                                  mr  

18:31 Carmenza Thomas DO is Private Physician.                                               mr  

20:33 Carmenza Thomas DO is Attending Physician.                                             kd3 

                                                                                                  

Administered Medications:                                                                         

No medications were administered                                                                  

                                                                                                  

                                                                                                  

Outcome:                                                                                          

20:33 Patient left the ED.                                                                    kd3 

                                                                                                  

Signatures:                                                                                       

Fariba Pickett Kyli, RN                      RN   kd3                                                  

                                                                                                  

**************************************************************************************************

## 2025-04-06 ENCOUNTER — HOSPITAL ENCOUNTER (INPATIENT)
Dept: HOSPITAL 97 - ER | Age: 80
LOS: 6 days | Discharge: HOME | DRG: 194 | End: 2025-04-12
Attending: INTERNAL MEDICINE | Admitting: INTERNAL MEDICINE
Payer: COMMERCIAL

## 2025-04-06 VITALS — BODY MASS INDEX: 33.5 KG/M2

## 2025-04-06 DIAGNOSIS — E87.1: ICD-10-CM

## 2025-04-06 DIAGNOSIS — Z86.718: ICD-10-CM

## 2025-04-06 DIAGNOSIS — J18.9: Primary | ICD-10-CM

## 2025-04-06 DIAGNOSIS — R31.29: ICD-10-CM

## 2025-04-06 DIAGNOSIS — Z66: ICD-10-CM

## 2025-04-06 DIAGNOSIS — Z79.899: ICD-10-CM

## 2025-04-06 DIAGNOSIS — Z86.711: ICD-10-CM

## 2025-04-06 DIAGNOSIS — Z79.01: ICD-10-CM

## 2025-04-06 DIAGNOSIS — Z11.52: ICD-10-CM

## 2025-04-06 DIAGNOSIS — I48.92: ICD-10-CM

## 2025-04-06 DIAGNOSIS — I48.91: ICD-10-CM

## 2025-04-06 DIAGNOSIS — F17.200: ICD-10-CM

## 2025-04-06 DIAGNOSIS — N17.9: ICD-10-CM

## 2025-04-06 LAB
ALBUMIN SERPL BCP-MCNC: 3.7 G/DL (ref 3.4–5)
ALBUMIN/GLOB SERPL: 0.8 {RATIO} (ref 1.1–1.8)
ANION GAP SERPL CALC-SCNC: 11.8 MEQ/L (ref 5–15)
APTT BLD: 33.6 SECONDS (ref 27.2–37.4)
GLOBULIN SER CALC-MCNC: 4.4 G/DL (ref 2.3–3.5)
HCT VFR BLD CALC: 42.4 % (ref 39.6–49)
HGB BLD-MCNC: 14.4 G/DL (ref 13.6–17.9)
INR BLD: 1.69
LYMPHOCYTES # SPEC AUTO: 1.2 K/UL (ref 0.7–4.9)
MCH RBC QN AUTO: 31.4 PG (ref 27–35)
MCV RBC: 92.4 FL (ref 80–100)
NRBC BLD AUTO-RTO: 0.1 % (ref 0–0)
PMV BLD: 8.5 FL (ref 7.6–11.3)
POTASSIUM SERPL-SCNC: 3.8 MEQ/L (ref 3.5–5.1)
PROTHROMBIN TIME: 18.8 SECONDS (ref 10–13)
RBC # BLD: 4.59 M/UL (ref 4.33–5.43)
SQUAMOUS URNS QL MICRO: <5 /HPF
UA COMPLETE W REFLEX CULTURE PNL UR: (no result)
UA DIPSTICK W REFLEX MICRO PNL UR: (no result)

## 2025-04-06 PROCEDURE — 96361 HYDRATE IV INFUSION ADD-ON: CPT

## 2025-04-06 PROCEDURE — 96365 THER/PROPH/DIAG IV INF INIT: CPT

## 2025-04-06 PROCEDURE — 99285 EMERGENCY DEPT VISIT HI MDM: CPT

## 2025-04-06 PROCEDURE — 81001 URINALYSIS AUTO W/SCOPE: CPT

## 2025-04-06 PROCEDURE — 96366 THER/PROPH/DIAG IV INF ADDON: CPT

## 2025-04-06 PROCEDURE — 97161 PT EVAL LOW COMPLEX 20 MIN: CPT

## 2025-04-06 PROCEDURE — 83605 ASSAY OF LACTIC ACID: CPT

## 2025-04-06 PROCEDURE — 85610 PROTHROMBIN TIME: CPT

## 2025-04-06 PROCEDURE — 80048 BASIC METABOLIC PNL TOTAL CA: CPT

## 2025-04-06 PROCEDURE — 85730 THROMBOPLASTIN TIME PARTIAL: CPT

## 2025-04-06 PROCEDURE — 74177 CT ABD & PELVIS W/CONTRAST: CPT

## 2025-04-06 PROCEDURE — 85025 COMPLETE CBC W/AUTO DIFF WBC: CPT

## 2025-04-06 PROCEDURE — 71045 X-RAY EXAM CHEST 1 VIEW: CPT

## 2025-04-06 PROCEDURE — 80053 COMPREHEN METABOLIC PANEL: CPT

## 2025-04-06 PROCEDURE — 71250 CT THORAX DX C-: CPT

## 2025-04-06 PROCEDURE — 97165 OT EVAL LOW COMPLEX 30 MIN: CPT

## 2025-04-06 PROCEDURE — 97530 THERAPEUTIC ACTIVITIES: CPT

## 2025-04-06 PROCEDURE — 97116 GAIT TRAINING THERAPY: CPT

## 2025-04-06 PROCEDURE — 93005 ELECTROCARDIOGRAM TRACING: CPT

## 2025-04-06 PROCEDURE — 71260 CT THORAX DX C+: CPT

## 2025-04-06 PROCEDURE — 36415 COLL VENOUS BLD VENIPUNCTURE: CPT

## 2025-04-06 PROCEDURE — 87428 SARSCOV & INF VIR A&B AG IA: CPT

## 2025-04-06 PROCEDURE — 87040 BLOOD CULTURE FOR BACTERIA: CPT

## 2025-04-06 PROCEDURE — 80202 ASSAY OF VANCOMYCIN: CPT

## 2025-04-06 PROCEDURE — 96368 THER/DIAG CONCURRENT INF: CPT

## 2025-04-06 RX ADMIN — DOXYCYCLINE SCH MG: 100 CAPSULE ORAL at 21:05

## 2025-04-06 RX ADMIN — SODIUM CHLORIDE SCH MLS: 0.9 INJECTION, SOLUTION INTRAVENOUS at 21:04

## 2025-04-06 RX ADMIN — METOPROLOL TARTRATE SCH MG: 25 TABLET ORAL at 18:55

## 2025-04-06 RX ADMIN — ENOXAPARIN SODIUM SCH MG: 100 INJECTION SUBCUTANEOUS at 21:04

## 2025-04-06 RX ADMIN — ACETAMINOPHEN PRN MG: 500 TABLET, FILM COATED ORAL at 23:58

## 2025-04-07 LAB
ALBUMIN SERPL BCP-MCNC: 3.1 G/DL (ref 3.4–5)
ALBUMIN/GLOB SERPL: 0.8 {RATIO} (ref 1.1–1.8)
ANION GAP SERPL CALC-SCNC: 9.1 MEQ/L (ref 5–15)
GLOBULIN SER CALC-MCNC: 4.1 G/DL (ref 2.3–3.5)
HCT VFR BLD CALC: 43.1 % (ref 39.6–49)
HGB BLD-MCNC: 14.5 G/DL (ref 13.6–17.9)
INR BLD: 1.81
LYMPHOCYTES # SPEC AUTO: 1.2 K/UL (ref 0.7–4.9)
MCH RBC QN AUTO: 31.5 PG (ref 27–35)
MCHC RBC AUTO-ENTMCNC: 33.5 G/DL (ref 32–36)
MCV RBC: 94.1 FL (ref 80–100)
PMV BLD: 8.7 FL (ref 7.6–11.3)
POTASSIUM SERPL-SCNC: 4.1 MEQ/L (ref 3.5–5.1)
PROTHROMBIN TIME: 20.1 SECONDS (ref 10–13)
RBC # BLD: 4.58 M/UL (ref 4.33–5.43)

## 2025-04-07 RX ADMIN — CEFTRIAXONE SCH MLS: 1 INJECTION, POWDER, FOR SOLUTION INTRAMUSCULAR; INTRAVENOUS at 09:38

## 2025-04-07 RX ADMIN — WARFARIN SODIUM SCH: 7.5 TABLET ORAL at 09:00

## 2025-04-07 RX ADMIN — WARFARIN SODIUM SCH MG: 7.5 TABLET ORAL at 09:45

## 2025-04-08 LAB
ANION GAP SERPL CALC-SCNC: 8.6 MEQ/L (ref 5–15)
HCT VFR BLD CALC: 39.2 % (ref 39.6–49)
HGB BLD-MCNC: 13.3 G/DL (ref 13.6–17.9)
INR BLD: 2.38
LYMPHOCYTES # SPEC AUTO: 0.9 K/UL (ref 0.7–4.9)
MCH RBC QN AUTO: 31.5 PG (ref 27–35)
MCV RBC: 92.5 FL (ref 80–100)
NRBC BLD AUTO-RTO: 0.1 % (ref 0–0)
PMV BLD: 8.9 FL (ref 7.6–11.3)
POTASSIUM SERPL-SCNC: 3.6 MEQ/L (ref 3.5–5.1)
RBC # BLD: 4.24 M/UL (ref 4.33–5.43)

## 2025-04-08 RX ADMIN — SENNOSIDES AND DOCUSATE SODIUM SCH TAB: 8.6; 5 TABLET ORAL at 21:14

## 2025-04-09 LAB
ANION GAP SERPL CALC-SCNC: 9.8 MEQ/L (ref 5–15)
HCT VFR BLD CALC: 39.2 % (ref 39.6–49)
HGB BLD-MCNC: 13.4 G/DL (ref 13.6–17.9)
INR BLD: 3.35
MCH RBC QN AUTO: 31.5 PG (ref 27–35)
MCHC RBC AUTO-ENTMCNC: 34.2 G/DL (ref 32–36)
MCV RBC: 92.1 FL (ref 80–100)
NRBC BLD AUTO-RTO: 0.1 % (ref 0–0)
PMV BLD: 8.2 FL (ref 7.6–11.3)
POTASSIUM SERPL-SCNC: 3.8 MEQ/L (ref 3.5–5.1)
PROTHROMBIN TIME: 36.1 SECONDS (ref 10–13)
RBC # BLD: 4.25 M/UL (ref 4.33–5.43)

## 2025-04-09 RX ADMIN — SODIUM CHLORIDE SCH MLS: 9 INJECTION, SOLUTION INTRAVENOUS at 16:20

## 2025-04-09 RX ADMIN — METRONIDAZOLE SCH MLS: 500 INJECTION, SOLUTION INTRAVENOUS at 16:19

## 2025-04-09 RX ADMIN — POLYETHYLENE GLYCOL 3350 SCH GM: 17 POWDER, FOR SOLUTION ORAL at 09:10

## 2025-04-09 RX ADMIN — SODIUM CHLORIDE ONE MLS: 9 INJECTION, SOLUTION INTRAVENOUS at 15:23

## 2025-04-10 VITALS — OXYGEN SATURATION: 96 %

## 2025-04-10 LAB
ANION GAP SERPL CALC-SCNC: 10.5 MEQ/L (ref 5–15)
HCT VFR BLD CALC: 37.6 % (ref 39.6–49)
HGB BLD-MCNC: 12.8 G/DL (ref 13.6–17.9)
LYMPHOCYTES # SPEC AUTO: 0.9 K/UL (ref 0.7–4.9)
MCH RBC QN AUTO: 31.2 PG (ref 27–35)
MCV RBC: 91.9 FL (ref 80–100)
NRBC BLD AUTO-RTO: 0.2 % (ref 0–0)
PMV BLD: 8.4 FL (ref 7.6–11.3)
POTASSIUM SERPL-SCNC: 3.5 MEQ/L (ref 3.5–5.1)
RBC # BLD: 4.09 M/UL (ref 4.33–5.43)

## 2025-04-10 RX ADMIN — METOPROLOL SUCCINATE SCH MG: 25 TABLET, EXTENDED RELEASE ORAL at 08:40

## 2025-04-10 RX ADMIN — SODIUM CHLORIDE SCH MLS: 9 INJECTION, SOLUTION INTRAVENOUS at 10:11

## 2025-04-10 RX ADMIN — SODIUM CHLORIDE SCH MLS: 9 INJECTION, SOLUTION INTRAVENOUS at 16:41

## 2025-04-10 RX ADMIN — IBUPROFEN ONE MG: 400 TABLET ORAL at 19:44

## 2025-04-10 RX ADMIN — TAMSULOSIN HYDROCHLORIDE SCH MG: 0.4 CAPSULE ORAL at 08:37

## 2025-04-11 LAB
ANION GAP SERPL CALC-SCNC: 10.4 MEQ/L (ref 5–15)
HCT VFR BLD CALC: 35.8 % (ref 39.6–49)
HGB BLD-MCNC: 12.2 G/DL (ref 13.6–17.9)
INR BLD: 4.89
LYMPHOCYTES # SPEC AUTO: 1.1 K/UL (ref 0.7–4.9)
MCHC RBC AUTO-ENTMCNC: 34.1 G/DL (ref 32–36)
MCV RBC: 93.7 FL (ref 80–100)
NRBC BLD AUTO-RTO: 0.1 % (ref 0–0)
PMV BLD: 8.7 FL (ref 7.6–11.3)
POTASSIUM SERPL-SCNC: 3.4 MEQ/L (ref 3.5–5.1)
PROTHROMBIN TIME: 51.7 SECONDS (ref 10–13)
RBC # BLD: 3.82 M/UL (ref 4.33–5.43)

## 2025-04-11 RX ADMIN — PHYTONADIONE ONE MG: 10 INJECTION, EMULSION INTRAMUSCULAR; INTRAVENOUS; SUBCUTANEOUS at 13:27

## 2025-04-11 RX ADMIN — POTASSIUM CHLORIDE ONE MEQ: 10 TABLET, FILM COATED, EXTENDED RELEASE ORAL at 10:17

## 2025-04-12 VITALS — DIASTOLIC BLOOD PRESSURE: 75 MMHG | SYSTOLIC BLOOD PRESSURE: 112 MMHG | TEMPERATURE: 98.3 F

## 2025-04-12 LAB
ANION GAP SERPL CALC-SCNC: 9.8 MEQ/L (ref 5–15)
HGB BLD-MCNC: 11.7 G/DL (ref 13.6–17.9)
INR BLD: 3.5
LYMPHOCYTES # SPEC AUTO: 1.1 K/UL (ref 0.7–4.9)
MCH RBC QN AUTO: 31.7 PG (ref 27–35)
MCHC RBC AUTO-ENTMCNC: 34.3 G/DL (ref 32–36)
MCV RBC: 92.4 FL (ref 80–100)
NRBC BLD AUTO-RTO: 0.2 % (ref 0–0)
PMV BLD: 8.5 FL (ref 7.6–11.3)
POTASSIUM SERPL-SCNC: 3.8 MEQ/L (ref 3.5–5.1)
PROTHROMBIN TIME: 37.6 SECONDS (ref 10–13)
RBC # BLD: 3.68 M/UL (ref 4.33–5.43)

## 2025-04-12 RX ADMIN — SODIUM CHLORIDE SCH: 9 INJECTION, SOLUTION INTRAVENOUS at 11:00
